# Patient Record
Sex: FEMALE | Race: WHITE
[De-identification: names, ages, dates, MRNs, and addresses within clinical notes are randomized per-mention and may not be internally consistent; named-entity substitution may affect disease eponyms.]

---

## 2019-08-13 ENCOUNTER — HOSPITAL ENCOUNTER (EMERGENCY)
Dept: HOSPITAL 10 - FTE | Age: 34
Discharge: HOME | End: 2019-08-13
Payer: COMMERCIAL

## 2019-08-13 ENCOUNTER — HOSPITAL ENCOUNTER (EMERGENCY)
Dept: HOSPITAL 91 - FTE | Age: 34
Discharge: HOME | End: 2019-08-13
Payer: COMMERCIAL

## 2019-08-13 VITALS
WEIGHT: 162.48 LBS | HEIGHT: 65 IN | BODY MASS INDEX: 27.07 KG/M2 | BODY MASS INDEX: 27.07 KG/M2 | HEIGHT: 65 IN | WEIGHT: 162.48 LBS

## 2019-08-13 VITALS — HEART RATE: 63 BPM | RESPIRATION RATE: 17 BRPM | DIASTOLIC BLOOD PRESSURE: 59 MMHG | SYSTOLIC BLOOD PRESSURE: 111 MMHG

## 2019-08-13 DIAGNOSIS — R07.89: Primary | ICD-10-CM

## 2019-08-13 DIAGNOSIS — R20.2: ICD-10-CM

## 2019-08-13 PROCEDURE — 99284 EMERGENCY DEPT VISIT MOD MDM: CPT

## 2019-08-13 PROCEDURE — 71045 X-RAY EXAM CHEST 1 VIEW: CPT

## 2019-08-13 PROCEDURE — 81025 URINE PREGNANCY TEST: CPT

## 2019-08-13 PROCEDURE — 93005 ELECTROCARDIOGRAM TRACING: CPT

## 2019-08-13 PROCEDURE — 82962 GLUCOSE BLOOD TEST: CPT

## 2019-08-13 RX ADMIN — LORAZEPAM 1 MG: 1 TABLET ORAL at 09:44

## 2019-08-13 NOTE — ERD
ER Documentation


Chief Complaint


Chief Complaint





NUMBNESS ON LEFT ARM X2 DAYS





HPI


Otherwise healthy 34-year-old female here complaining of left-sided paresthesias


and chest tightness that began yesterday.  The paresthesias have improved today 


but now she continues to have worsening chest tightness.  She has no cardiac 


past medical history.  No shortness of breath or palpitations.  She does have a 


history of anxiety.  She has no headache.  No abdominal pain.  Denies any 


alcohol or drugs.





ROS


All systems reviewed and are negative except as per history of present illness.





Medications


Home Meds


Active Scripts


Ibuprofen* (Motrin*) 800 Mg Tab, 800 MG PO Q6H PRN for PAIN AND OR ELEVATED 


TEMP, #30 TAB


   Prov:RENETTA CONTRERAS MD         8/15/15





Allergies


Allergies:  


Coded Allergies:  


     No Known Allergies (Verified  Allergy, Unknown, 8/15/15)





PMhx/Soc


Hx Alcohol Use:  No


Hx Substance Use:  No


Hx Tobacco Use:  No





FmHx


Family History:  No diabetes





Physical Exam


Vitals





Vital Signs


  Date      Temp  Pulse  Resp  B/P (MAP)   Pulse Ox  O2          O2 Flow    FiO2


Time                                                 Delivery    Rate


   8/13/19  98.2     63    17      111/59        99


     09:13                           (76)





Physical Exam


INITIAL VITAL SIGNS: Reviewed by me


GENERAL: Awake, alert and oriented x 4, well appearing, nontoxic, speaking in 


full sentences. No acute distress


HEAD: Atraumatic


NECK: Supple. No masses. Full range of motion.  No meningismus. No midline 


tenderness. 


EYES: EOMI. PERRL.


 


THROAT: No tonilar erythema or edema. No exudates. Uvula midline. No kissing 


tonsils.


RESPIRATORY: Clear to auscultation bilaterally. Symmetric chest wall rise.  No 


wheezing or rales. No accessory muscle use.  


CV: Regular rate and rhythm. No murmurs, rubs, or gallops.  


ABDOMEN: Soft, non-distended. Nontender. Negative [place]. Negative McBurneys 


point tenderness. No CVA tenderness bilaterally. No guarding. No rebound. 


 Neuro:


 M/S:      Alert and oriented


 Face:      EOMI, face and pharynx with normal sensation and function


 Motor:    Normal strength throughout


 Sensation:    Normal sensation throughout


 Speech:   Normal


 Cerebel:   Normal coordination


      Normal gait


      Normal finger to nose


Results 24 hrs





Laboratory Tests


            Test
                      8/13/19
09:43  8/13/19
09:47


            POC Beta HCG, Qualitative  NEGATIVE


            Bedside Glucose                               78 mg/dL





Current Medications


 Medications
   Dose
          Sig/Neto
       Start Time
   Status  Last


 (Trade)       Ordered        Route
 PRN     Stop Time              Admin
Dose


                              Reason                                Admin


 Lorazepam
     1 mg           ONCE  ONCE
    8/13/19       DC           8/13/19


(Ativan)                      PO
            09:30
                       09:44



                                             8/13/19 09:31








Procedures/MDM


Patient presents with chest tightness and paresthesias.  Her neurological exam 


is normal.  She has no cardiac past medical history.  Accu-Chek is 78.  


Pregnancy test is negative.  EKG is sinus bradycardia with a rate of 59 with no 


evidence of ST elevation or acute ischemic changes.  Chest x-ray is negative.  


Patient counseled regarding my diagnostic impression and care plan. Prior to 


discharge all questions answered. Pt agrees with treatment plan and understands 


strict return precautions. Pt is instructed to follow up with primary care 


provider within 24-48 hours. Precautionary instructions provided including 


instructions to return to the ER if not improving or for any worsening or 


changing symptoms or concerns.





Departure


Diagnosis:  


   Primary Impression:  


   Atypical chest pain


   Additional Impression:  


   Paresthesia


Condition:  Stable











BETTINA JAVIER PA-C            Aug 13, 2019 09:37